# Patient Record
Sex: FEMALE | Race: WHITE
[De-identification: names, ages, dates, MRNs, and addresses within clinical notes are randomized per-mention and may not be internally consistent; named-entity substitution may affect disease eponyms.]

---

## 2018-03-05 ENCOUNTER — HOSPITAL ENCOUNTER (EMERGENCY)
Dept: HOSPITAL 47 - EC | Age: 73
Discharge: HOME | End: 2018-03-05
Payer: MEDICARE

## 2018-03-05 VITALS
RESPIRATION RATE: 20 BRPM | TEMPERATURE: 99 F | DIASTOLIC BLOOD PRESSURE: 76 MMHG | SYSTOLIC BLOOD PRESSURE: 145 MMHG | HEART RATE: 109 BPM

## 2018-03-05 DIAGNOSIS — J11.1: ICD-10-CM

## 2018-03-05 DIAGNOSIS — E86.0: ICD-10-CM

## 2018-03-05 DIAGNOSIS — K52.9: Primary | ICD-10-CM

## 2018-03-05 DIAGNOSIS — Z87.891: ICD-10-CM

## 2018-03-05 DIAGNOSIS — Z79.899: ICD-10-CM

## 2018-03-05 DIAGNOSIS — J40: ICD-10-CM

## 2018-03-05 DIAGNOSIS — Z79.52: ICD-10-CM

## 2018-03-05 LAB
ALBUMIN SERPL-MCNC: 4 G/DL (ref 3.5–5)
ALP SERPL-CCNC: 80 U/L (ref 38–126)
ALT SERPL-CCNC: 25 U/L (ref 9–52)
ANION GAP SERPL CALC-SCNC: 11 MMOL/L
APTT BLD: 22.2 SEC (ref 22–30)
AST SERPL-CCNC: 26 U/L (ref 14–36)
BASOPHILS # BLD AUTO: 0 K/UL (ref 0–0.2)
BASOPHILS NFR BLD AUTO: 0 %
BUN SERPL-SCNC: 11 MG/DL (ref 7–17)
CALCIUM SPEC-MCNC: 9.5 MG/DL (ref 8.4–10.2)
CHLORIDE SERPL-SCNC: 100 MMOL/L (ref 98–107)
CK SERPL-CCNC: 43 U/L (ref 30–135)
CO2 SERPL-SCNC: 29 MMOL/L (ref 22–30)
EOSINOPHIL # BLD AUTO: 0 K/UL (ref 0–0.7)
EOSINOPHIL NFR BLD AUTO: 0 %
ERYTHROCYTE [DISTWIDTH] IN BLOOD BY AUTOMATED COUNT: 5.02 M/UL (ref 3.8–5.4)
ERYTHROCYTE [DISTWIDTH] IN BLOOD: 13.6 % (ref 11.5–15.5)
GLUCOSE SERPL-MCNC: 110 MG/DL (ref 74–99)
HCT VFR BLD AUTO: 44.4 % (ref 34–46)
HGB BLD-MCNC: 14.7 GM/DL (ref 11.4–16)
INR PPP: 1.1 (ref ?–1.2)
LYMPHOCYTES # SPEC AUTO: 1.2 K/UL (ref 1–4.8)
LYMPHOCYTES NFR SPEC AUTO: 26 %
MCH RBC QN AUTO: 29.3 PG (ref 25–35)
MCHC RBC AUTO-ENTMCNC: 33.1 G/DL (ref 31–37)
MCV RBC AUTO: 88.4 FL (ref 80–100)
MONOCYTES # BLD AUTO: 0.4 K/UL (ref 0–1)
MONOCYTES NFR BLD AUTO: 10 %
NEUTROPHILS # BLD AUTO: 2.8 K/UL (ref 1.3–7.7)
NEUTROPHILS NFR BLD AUTO: 63 %
PLATELET # BLD AUTO: 253 K/UL (ref 150–450)
POTASSIUM SERPL-SCNC: 3 MMOL/L (ref 3.5–5.1)
PROT SERPL-MCNC: 7.3 G/DL (ref 6.3–8.2)
PT BLD: 10.6 SEC (ref 9–12)
SODIUM SERPL-SCNC: 140 MMOL/L (ref 137–145)
TROPONIN I SERPL-MCNC: <0.012 NG/ML (ref 0–0.03)
WBC # BLD AUTO: 4.4 K/UL (ref 3.8–10.6)

## 2018-03-05 PROCEDURE — 36415 COLL VENOUS BLD VENIPUNCTURE: CPT

## 2018-03-05 PROCEDURE — 85730 THROMBOPLASTIN TIME PARTIAL: CPT

## 2018-03-05 PROCEDURE — 85610 PROTHROMBIN TIME: CPT

## 2018-03-05 PROCEDURE — 84484 ASSAY OF TROPONIN QUANT: CPT

## 2018-03-05 PROCEDURE — 94640 AIRWAY INHALATION TREATMENT: CPT

## 2018-03-05 PROCEDURE — 93005 ELECTROCARDIOGRAM TRACING: CPT

## 2018-03-05 PROCEDURE — 96361 HYDRATE IV INFUSION ADD-ON: CPT

## 2018-03-05 PROCEDURE — 80053 COMPREHEN METABOLIC PANEL: CPT

## 2018-03-05 PROCEDURE — 87430 STREP A AG IA: CPT

## 2018-03-05 PROCEDURE — 84100 ASSAY OF PHOSPHORUS: CPT

## 2018-03-05 PROCEDURE — 96375 TX/PRO/DX INJ NEW DRUG ADDON: CPT

## 2018-03-05 PROCEDURE — 74022 RADEX COMPL AQT ABD SERIES: CPT

## 2018-03-05 PROCEDURE — 82550 ASSAY OF CK (CPK): CPT

## 2018-03-05 PROCEDURE — 83735 ASSAY OF MAGNESIUM: CPT

## 2018-03-05 PROCEDURE — 96374 THER/PROPH/DIAG INJ IV PUSH: CPT

## 2018-03-05 PROCEDURE — 87081 CULTURE SCREEN ONLY: CPT

## 2018-03-05 PROCEDURE — 87502 INFLUENZA DNA AMP PROBE: CPT

## 2018-03-05 PROCEDURE — 85025 COMPLETE CBC W/AUTO DIFF WBC: CPT

## 2018-03-05 PROCEDURE — 99284 EMERGENCY DEPT VISIT MOD MDM: CPT

## 2018-03-05 PROCEDURE — 83605 ASSAY OF LACTIC ACID: CPT

## 2018-03-05 PROCEDURE — 82553 CREATINE MB FRACTION: CPT

## 2018-03-05 NOTE — ED
General Adult HPI





- General


Chief complaint: Nausea/Vomiting/Diarrhea


Stated complaint: Flu


Time Seen by Provider: 03/05/18 10:36


Source: patient, RN notes reviewed, old records reviewed


Mode of arrival: ambulatory


Limitations: no limitations





- History of Present Illness


Initial comments: 





This is a 72-year-old female to the ER for evaluation of persistent nausea 

vomiting, not feeling well.  No specific pain.  Symptoms worse after eating.  

Patient has been on multiple different antibiotics recently for upper 

respiratory infection, bronchitis.  Patient has had occasional on and off 

diarrhea.  No fevers no travel history no sick contacts no family member with 

similar significant symptoms.  Patient has seen both her family doctor and 

pulmonologist in the last 2 weeks for evaluation, has completed all outpatient 

treatment, main complaint is side effects related to treatment course.





- Related Data


 Home Medications











 Medication  Instructions  Recorded  Confirmed


 


Azithromycin [Zithromax Z-pack] See Taper PO DAILY 03/05/18 03/05/18


 


Fluticasone Propionate [Flovent 2 puff INHALATION RT-DAILY 03/05/18 03/05/18





Hfa 110mcg]   


 


Ranitidine HCl 150 mg PO DAILY PRN 03/05/18 03/05/18


 


Theophylline Anhydrous [Uniphyl ER] 600 mg PO HS 03/05/18 03/05/18


 


methylPREDNISolone [Medrol Dose See Taper PO AS DIRECTED 03/05/18 03/05/18





Pack]   








 Previous Rx's











 Medication  Instructions  Recorded


 


Ondansetron Odt [Zofran ODT] 4 mg PO Q8HR PRN #30 tab 03/05/18











 Allergies











Allergy/AdvReac Type Severity Reaction Status Date / Time


 


No Known Allergies Allergy   Verified 03/05/18 10:46














Review of Systems


ROS Statement: 


Those systems with pertinent positive or pertinent negative responses have been 

documented in the HPI.





ROS Other: All systems not noted in ROS Statement are negative.





Past Medical History


Past Medical History: No Reported History


History of Any Multi-Drug Resistant Organisms: None Reported


Past Surgical History: No Surgical Hx Reported


Past Psychological History: No Psychological Hx Reported


Smoking Status: Former smoker


Past Alcohol Use History: None Reported





General Exam


Limitations: no limitations


General appearance: alert, in no apparent distress


Head exam: Present: atraumatic, normocephalic, normal inspection


Eye exam: Present: normal appearance, PERRL, EOMI.  Absent: scleral icterus, 

conjunctival injection, periorbital swelling


ENT exam: Present: normal exam, mucous membranes dry


Neck exam: Present: normal inspection.  Absent: tenderness, meningismus, 

lymphadenopathy


Respiratory exam: Present: normal lung sounds bilaterally.  Absent: respiratory 

distress, wheezes, rales, rhonchi, stridor


Cardiovascular Exam: Present: regular rate, normal rhythm, normal heart sounds.

  Absent: systolic murmur, diastolic murmur, rubs, gallop, clicks


GI/Abdominal exam: Present: soft, normal bowel sounds.  Absent: distended, 

tenderness, guarding, rebound, rigid


Extremities exam: Present: normal inspection, full ROM, normal capillary 

refill.  Absent: tenderness, pedal edema, joint swelling, calf tenderness


Back exam: Present: normal inspection


Neurological exam: Present: alert, oriented X3, CN II-XII intact


Psychiatric exam: Present: normal affect, normal mood


Skin exam: Present: warm, dry, intact, normal color.  Absent: rash





Course


 Vital Signs











  03/05/18





  10:18


 


Temperature 96.7 F L


 


Pulse Rate 105 H


 


Respiratory 18





Rate 


 


Blood Pressure 117/72


 


O2 Sat by Pulse 97





Oximetry 














- Reevaluation(s)


Reevaluation #1: 





03/05/18 12:16


Patient given IV hydration here in the ER, feeling better, potassium is replaced


Reevaluation #2: 





03/05/18 12:16


Patient remains improved after breathing treatment





EKG Findings





- EKG Comments:


EKG Findings:: EKG shows sinus rhythm rate of 95, , QRS 64, 





Medical Decision Making





- Medical Decision Making





72 female the ER with nausea vomiting, visual diarrhea likely related to 

medication side effects.  Patient will be given Zofran, patient is positive for 

informs a, outside of treatment window for Tamiflu, no fever encouraged 

increased fluid intake and discharged home





- Lab Data


Result diagrams: 


 03/05/18 11:40





 03/05/18 11:40


 Lab Results











  03/05/18 03/05/18 03/05/18 Range/Units





  11:40 11:40 11:40 


 


WBC  4.4    (3.8-10.6)  k/uL


 


RBC  5.02    (3.80-5.40)  m/uL


 


Hgb  14.7    (11.4-16.0)  gm/dL


 


Hct  44.4    (34.0-46.0)  %


 


MCV  88.4    (80.0-100.0)  fL


 


MCH  29.3    (25.0-35.0)  pg


 


MCHC  33.1    (31.0-37.0)  g/dL


 


RDW  13.6    (11.5-15.5)  %


 


Plt Count  253    (150-450)  k/uL


 


Neutrophils %  63    %


 


Lymphocytes %  26    %


 


Monocytes %  10    %


 


Eosinophils %  0    %


 


Basophils %  0    %


 


Neutrophils #  2.8    (1.3-7.7)  k/uL


 


Lymphocytes #  1.2    (1.0-4.8)  k/uL


 


Monocytes #  0.4    (0-1.0)  k/uL


 


Eosinophils #  0.0    (0-0.7)  k/uL


 


Basophils #  0.0    (0-0.2)  k/uL


 


PT     (9.0-12.0)  sec


 


INR     (<1.2)  


 


APTT     (22.0-30.0)  sec


 


Sodium   140   (137-145)  mmol/L


 


Potassium   3.0 L*   (3.5-5.1)  mmol/L


 


Chloride   100   ()  mmol/L


 


Carbon Dioxide   29   (22-30)  mmol/L


 


Anion Gap   11   mmol/L


 


BUN   11   (7-17)  mg/dL


 


Creatinine   0.80   (0.52-1.04)  mg/dL


 


Est GFR (MDRD) Af Amer   >60   (>60 ml/min/1.73 sqM)  


 


Est GFR (MDRD) Non-Af   >60   (>60 ml/min/1.73 sqM)  


 


Glucose   110 H   (74-99)  mg/dL


 


Plasma Lactic Acid Bal    1.1  (0.7-2.0)  mmol/L


 


Calcium   9.5   (8.4-10.2)  mg/dL


 


Phosphorus   3.3   (2.5-4.5)  mg/dL


 


Magnesium   1.9   (1.6-2.3)  mg/dL


 


Total Bilirubin   0.5   (0.2-1.3)  mg/dL


 


AST   26   (14-36)  U/L


 


ALT   25   (9-52)  U/L


 


Alkaline Phosphatase   80   ()  U/L


 


Total Protein   7.3   (6.3-8.2)  g/dL


 


Albumin   4.0   (3.5-5.0)  g/dL


 


Influenza Type A RNA     (Not Detectd)  


 


Influenza Type B (PCR)     (Not Detectd)  


 


Group A Strep Rapid     (Negative)  














  03/05/18 03/05/18 03/05/18 Range/Units





  11:40 11:40 11:40 


 


WBC     (3.8-10.6)  k/uL


 


RBC     (3.80-5.40)  m/uL


 


Hgb     (11.4-16.0)  gm/dL


 


Hct     (34.0-46.0)  %


 


MCV     (80.0-100.0)  fL


 


MCH     (25.0-35.0)  pg


 


MCHC     (31.0-37.0)  g/dL


 


RDW     (11.5-15.5)  %


 


Plt Count     (150-450)  k/uL


 


Neutrophils %     %


 


Lymphocytes %     %


 


Monocytes %     %


 


Eosinophils %     %


 


Basophils %     %


 


Neutrophils #     (1.3-7.7)  k/uL


 


Lymphocytes #     (1.0-4.8)  k/uL


 


Monocytes #     (0-1.0)  k/uL


 


Eosinophils #     (0-0.7)  k/uL


 


Basophils #     (0-0.2)  k/uL


 


PT  10.6    (9.0-12.0)  sec


 


INR  1.1    (<1.2)  


 


APTT  22.2    (22.0-30.0)  sec


 


Sodium     (137-145)  mmol/L


 


Potassium     (3.5-5.1)  mmol/L


 


Chloride     ()  mmol/L


 


Carbon Dioxide     (22-30)  mmol/L


 


Anion Gap     mmol/L


 


BUN     (7-17)  mg/dL


 


Creatinine     (0.52-1.04)  mg/dL


 


Est GFR (MDRD) Af Amer     (>60 ml/min/1.73 sqM)  


 


Est GFR (MDRD) Non-Af     (>60 ml/min/1.73 sqM)  


 


Glucose     (74-99)  mg/dL


 


Plasma Lactic Acid Bal     (0.7-2.0)  mmol/L


 


Calcium     (8.4-10.2)  mg/dL


 


Phosphorus     (2.5-4.5)  mg/dL


 


Magnesium     (1.6-2.3)  mg/dL


 


Total Bilirubin     (0.2-1.3)  mg/dL


 


AST     (14-36)  U/L


 


ALT     (9-52)  U/L


 


Alkaline Phosphatase     ()  U/L


 


Total Protein     (6.3-8.2)  g/dL


 


Albumin     (3.5-5.0)  g/dL


 


Influenza Type A RNA   Not Detected   (Not Detectd)  


 


Influenza Type B (PCR)   Detected H   (Not Detectd)  


 


Group A Strep Rapid    Negative  (Negative)  














- Radiology Data


Radiology results: report reviewed (X-ray abdominal series and chest is 

negative for acute disease), image reviewed





Disposition


Clinical Impression: 


 Dehydration, Drug-induced nausea and vomiting, Gastroenteritis, Bronchitis, 

Influenza





Disposition: HOME SELF-CARE


Condition: Good


Instructions:  Acute Nausea and Vomiting (ED), Influenza (ED)


Prescriptions: 


Ondansetron Odt [Zofran ODT] 4 mg PO Q8HR PRN #30 tab


 PRN Reason: nausea/vomiting


Referrals: 


Grant Orta MD [Primary Care Provider] - 1-2 days

## 2018-03-05 NOTE — XR
EXAMINATION TYPE: XR abdomen acute w cxr

 

DATE OF EXAM: 3/5/2018

 

CLINICAL HISTORY: Cough and congestion with nausea and vomiting. 

 

TECHNIQUE: Single frontal view of chest is obtained.  Supine and upright views of the abdomen are acq
uired.  

 

COMPARISON: Chest x-ray from 3 days ago.

 

FINDINGS: There is chronic emphysematous change without suspicious new focal airspace opacity, pleura
l effusion, or pneumothorax seen bilaterally.  Cardiac silhouette size appears within normal limits w
ith atherosclerotic change in aortic knob. Stable right upper lobe 8mm nodule noted. Osseous structur
es are intact.  

 

Gas is noted in nondistended stomach and small bowel loops.  Gas and fecal material is seen in nondis
tended colon. Serpiginous densities in the pelvis likely reflects product of surgery or prolapse. Cor
relate clinically. Some pelvic phleboliths are seen. No pneumoperitoneum is identified.

 

IMPRESSION:  

1. Chronic emphysematous change without acute pulmonary process. Redemonstration of 8 mm right upper 
lobe nodule. Advise nonemergent CT follow-up.

2. Overall nonspecific but likely nonobstructive bowel gas pattern.

## 2020-06-09 ENCOUNTER — HOSPITAL ENCOUNTER (OUTPATIENT)
Dept: HOSPITAL 47 - LABWHC1 | Age: 75
Discharge: HOME | End: 2020-06-09
Payer: MEDICARE

## 2020-06-09 DIAGNOSIS — I10: Primary | ICD-10-CM

## 2020-06-09 LAB
ALBUMIN SERPL-MCNC: 4.4 G/DL (ref 3.8–4.9)
ALBUMIN/GLOB SERPL: 2 G/DL (ref 1.6–3.17)
ALP SERPL-CCNC: 80 U/L (ref 41–126)
ALT SERPL-CCNC: 18 U/L (ref 8–44)
ANION GAP SERPL CALC-SCNC: 9.6 MMOL/L (ref 4–12)
AST SERPL-CCNC: 32 U/L (ref 13–35)
BASOPHILS # BLD AUTO: 0 K/UL (ref 0–0.2)
BASOPHILS NFR BLD AUTO: 1 %
BUN SERPL-SCNC: 10 MG/DL (ref 9–27)
BUN/CREAT SERPL: 10 RATIO (ref 12–20)
CALCIUM SPEC-MCNC: 9.4 MG/DL (ref 8.7–10.3)
CHLORIDE SERPL-SCNC: 106 MMOL/L (ref 96–109)
CO2 SERPL-SCNC: 25.4 MMOL/L (ref 21.6–31.8)
EOSINOPHIL # BLD AUTO: 0.2 K/UL (ref 0–0.7)
EOSINOPHIL NFR BLD AUTO: 4 %
ERYTHROCYTE [DISTWIDTH] IN BLOOD BY AUTOMATED COUNT: 4.77 M/UL (ref 3.8–5.4)
ERYTHROCYTE [DISTWIDTH] IN BLOOD: 14 % (ref 11.5–15.5)
GLOBULIN SER CALC-MCNC: 2.2 G/DL (ref 1.6–3.3)
GLUCOSE SERPL-MCNC: 77 MG/DL (ref 70–110)
HCT VFR BLD AUTO: 45.5 % (ref 34–46)
HGB BLD-MCNC: 14.8 GM/DL (ref 11.4–16)
LYMPHOCYTES # SPEC AUTO: 1.4 K/UL (ref 1–4.8)
LYMPHOCYTES NFR SPEC AUTO: 26 %
MCH RBC QN AUTO: 31 PG (ref 25–35)
MCHC RBC AUTO-ENTMCNC: 32.5 G/DL (ref 31–37)
MCV RBC AUTO: 95.4 FL (ref 80–100)
MONOCYTES # BLD AUTO: 0.4 K/UL (ref 0–1)
MONOCYTES NFR BLD AUTO: 7 %
NEUTROPHILS # BLD AUTO: 3.2 K/UL (ref 1.3–7.7)
NEUTROPHILS NFR BLD AUTO: 59 %
PLATELET # BLD AUTO: 273 K/UL (ref 150–450)
POTASSIUM SERPL-SCNC: 4.3 MMOL/L (ref 3.5–5.5)
PROT SERPL-MCNC: 6.6 G/DL (ref 6.2–8.2)
SODIUM SERPL-SCNC: 141 MMOL/L (ref 135–145)
WBC # BLD AUTO: 5.3 K/UL (ref 3.8–10.6)

## 2020-06-09 PROCEDURE — 80053 COMPREHEN METABOLIC PANEL: CPT

## 2020-06-09 PROCEDURE — 85025 COMPLETE CBC W/AUTO DIFF WBC: CPT

## 2020-06-09 PROCEDURE — 36415 COLL VENOUS BLD VENIPUNCTURE: CPT

## 2022-02-24 ENCOUNTER — HOSPITAL ENCOUNTER (OUTPATIENT)
Dept: HOSPITAL 47 - RADXRMAIN | Age: 77
Discharge: HOME | End: 2022-02-24
Payer: MEDICARE

## 2022-02-24 DIAGNOSIS — J44.9: Primary | ICD-10-CM

## 2022-02-24 PROCEDURE — 71046 X-RAY EXAM CHEST 2 VIEWS: CPT

## 2022-02-24 NOTE — XR
EXAMINATION TYPE: XR chest 2V

 

DATE OF EXAM: 2/24/2022

 

COMPARISON: 3/2/2018

 

HISTORY: 76-year-old female U09.9, postcholecystectomy condition.

 

TECHNIQUE:  Frontal and lateral views

 

FINDINGS:  

Heart normal size. Atherosclerotic arch calcifications. Hyperinflation. Patchy interstitial changes i
n the upper lungs and also at the lower lungs. Diffuse interstitial prominence and peribronchial cuff
ing is noted. Possible nodular density left midlung. No pleural effusion. Accentuated mid thoracic ky
phosis.

 

 

IMPRESSION:  

 

1. COPD. Extensive chronic appearing interstitial changes. Consider further detailed parenchymal asse
ssment with CT of the chest.

2. The CT can also exclude a pulmonary nodule at the left midlung.

## 2022-05-26 ENCOUNTER — HOSPITAL ENCOUNTER (OUTPATIENT)
Dept: HOSPITAL 47 - RADUSWWP | Age: 77
Discharge: HOME | End: 2022-05-26
Attending: ORTHOPAEDIC SURGERY
Payer: MEDICARE
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
## 2022-05-26 DIAGNOSIS — I80.9: ICD-10-CM

## 2022-05-26 DIAGNOSIS — M16.11: ICD-10-CM

## 2022-05-26 DIAGNOSIS — Z48.89: Primary | ICD-10-CM

## 2022-05-26 DIAGNOSIS — S42.201D: ICD-10-CM

## 2022-05-26 DIAGNOSIS — M79.661: ICD-10-CM

## 2022-05-26 DIAGNOSIS — M79.621: ICD-10-CM

## 2022-05-26 PROCEDURE — 93970 EXTREMITY STUDY: CPT

## 2022-05-26 NOTE — US
EXAMINATION TYPE: US venous doppler duplex LE 

 

DATE OF EXAM: 5/26/2022 4:45 PM

 

COMPARISON: NONE

 

CLINICAL HISTORY: I80.9 PHLEBITIS AND THROMBOPHLEBITIS. Pain in the legs. No hx of DVT. Patient is on
 eliquis. Hx hip surgery 4/22/22.

 

SIDE PERFORMED: Bilateral  

 

TECHNIQUE:  The lower extremity deep venous system is examined utilizing real time linear array sonog
malka with graded compression, doppler sonography and color-flow sonography.

 

VESSELS IMAGED:

Common Femoral Vein

Deep Femoral Vein

Greater Saphenous Vein *

Femoral Vein

Popliteal Vein

Small Saphenous Vein *

Proximal Calf Veins

(* superficial vessels)

 

Right Leg: **Color defect/lack of color flow seen in femoral vein and popliteal vein. CFV, femoral ve
in, and popliteal vein appear to compress incompletely. Echoes seen within femoral vein and popliteal
 vein. Color flow seen in CFV. 

 

Left Leg:  CFV appears to compress incompletely. Color flow seen. Color defect seen in mid and distal
 femoral vein.

Femoral vein mid and distal appear to compress incompletely.

 

Exam is limited due to patient positioning.

 

IMPRESSION:  

Limited exam with incomplete compressibility of the right common femoral vein extending to the poplit
eal vein and left mid and distal femoral vein suggesting nonocclusive deep vein thrombosis.
Ambulatory, gait steady